# Patient Record
Sex: MALE | Race: OTHER | HISPANIC OR LATINO | URBAN - METROPOLITAN AREA
[De-identification: names, ages, dates, MRNs, and addresses within clinical notes are randomized per-mention and may not be internally consistent; named-entity substitution may affect disease eponyms.]

---

## 2017-04-21 ENCOUNTER — EMERGENCY (EMERGENCY)
Facility: HOSPITAL | Age: 32
LOS: 1 days | Discharge: HOME | End: 2017-04-21
Attending: EMERGENCY MEDICINE | Admitting: EMERGENCY MEDICINE
Payer: MEDICAID

## 2017-04-21 VITALS
TEMPERATURE: 96 F | SYSTOLIC BLOOD PRESSURE: 143 MMHG | DIASTOLIC BLOOD PRESSURE: 90 MMHG | HEART RATE: 63 BPM | HEIGHT: 66 IN | OXYGEN SATURATION: 96 % | RESPIRATION RATE: 16 BRPM

## 2017-04-21 DIAGNOSIS — H02.89 OTHER SPECIFIED DISORDERS OF EYELID: ICD-10-CM

## 2017-04-21 PROCEDURE — 99283 EMERGENCY DEPT VISIT LOW MDM: CPT | Mod: 25

## 2017-04-21 PROCEDURE — 99283 EMERGENCY DEPT VISIT LOW MDM: CPT

## 2017-04-21 RX ORDER — ERYTHROMYCIN BASE 5 MG/GRAM
1 OINTMENT (GRAM) OPHTHALMIC (EYE)
Qty: 1 | Refills: 0
Start: 2017-04-21 | End: 2017-05-01

## 2017-04-21 NOTE — ED PROVIDER NOTE - OBJECTIVE STATEMENT
31M with left lower eyelid pain and swelling x 2-3 days, no eye pain or redness. No vision loss. No fevers or chills

## 2017-04-21 NOTE — ED ADULT NURSE NOTE - OBJECTIVE STATEMENT
30 y/o male AXOX3 present in the ED with chief complaint of having a " white ball" underneath left eye, wipe it down with hot rag, and became swollen. patient denies any discharge in the eye, fever. denies any 30 y/o male AXOX3 present in the ED with chief complaint of having a " white ball" underneath left eye, wipe it down with hot rag, and became swollen. patient denies any discharge in the eye,  denies any blurry vision , fever. denies any nausea, vomiting. denies any SOB, lung field clear bilaterally. cap refill less then 3 seconds. strong pulses noted. abdomen soft nontender, nondistended. plan of care explained to patient. support and safety provided.

## 2017-04-21 NOTE — ED PROVIDER NOTE - MEDICAL DECISION MAKING DETAILS
Attending MD Hanson: 31M with hordeolum of left eye, will treat with warm compresses, erythro ointment, optho f/u to ensure resolution

## 2017-05-25 NOTE — ED ADULT NURSE NOTE - CAS TRG GEN SKIN COLOR
LOV 3/21/17 for ADD, med check, depressive disorder.  NOV  NONE listed.    Refill requested for ADDERALL XR, 20mg 24hr capsule. Take 1 capsule by mouth 3 X daily. Quantity 90.     Last script dispensed 3/23/17.    pdmp printed and placed on PCP's desk. Medication prepped.     Last med contract signed in 2015.    Urine drug screen 2/8/17 old drug screen. Normal for Patient.       Pink

## 2019-04-20 ENCOUNTER — EMERGENCY (EMERGENCY)
Facility: HOSPITAL | Age: 34
LOS: 1 days | Discharge: HOME | End: 2019-04-20
Attending: EMERGENCY MEDICINE
Payer: MEDICAID

## 2019-04-20 VITALS
OXYGEN SATURATION: 97 % | SYSTOLIC BLOOD PRESSURE: 128 MMHG | DIASTOLIC BLOOD PRESSURE: 72 MMHG | RESPIRATION RATE: 20 BRPM | TEMPERATURE: 98 F | HEIGHT: 67 IN | HEART RATE: 84 BPM | WEIGHT: 179.9 LBS

## 2019-04-20 PROCEDURE — 99283 EMERGENCY DEPT VISIT LOW MDM: CPT

## 2019-04-20 RX ORDER — AMOXICILLIN 250 MG/5ML
1 SUSPENSION, RECONSTITUTED, ORAL (ML) ORAL
Qty: 21 | Refills: 0
Start: 2019-04-20 | End: 2019-04-26

## 2019-04-20 RX ORDER — OXYCODONE HYDROCHLORIDE 5 MG/1
1 TABLET ORAL
Qty: 8 | Refills: 0
Start: 2019-04-20

## 2019-04-20 RX ORDER — AMOXICILLIN 250 MG/5ML
500 SUSPENSION, RECONSTITUTED, ORAL (ML) ORAL ONCE
Qty: 0 | Refills: 0 | Status: COMPLETED | OUTPATIENT
Start: 2019-04-20 | End: 2019-04-20

## 2019-04-20 RX ORDER — ACETAMINOPHEN 500 MG
975 TABLET ORAL ONCE
Qty: 0 | Refills: 0 | Status: COMPLETED | OUTPATIENT
Start: 2019-04-20 | End: 2019-04-20

## 2019-04-20 RX ORDER — OXYCODONE HYDROCHLORIDE 5 MG/1
5 TABLET ORAL ONCE
Qty: 0 | Refills: 0 | Status: DISCONTINUED | OUTPATIENT
Start: 2019-04-20 | End: 2019-04-20

## 2019-04-20 RX ADMIN — OXYCODONE HYDROCHLORIDE 5 MILLIGRAM(S): 5 TABLET ORAL at 16:14

## 2019-04-20 RX ADMIN — Medication 500 MILLIGRAM(S): at 16:14

## 2019-04-20 RX ADMIN — Medication 975 MILLIGRAM(S): at 16:14

## 2019-04-20 NOTE — ED PROVIDER NOTE - CLINICAL SUMMARY MEDICAL DECISION MAKING FREE TEXT BOX
Dr. Stanley (Attending Physician)  right lower posterior molar pain after attempted resection but failed.  no palpable periapical abscess but ttp will give pain control and start amoxicillin and have patient follow-up with another dental surgeon monday per plan with his dentist.

## 2019-04-20 NOTE — ED ADULT NURSE NOTE - OBJECTIVE STATEMENT
32 yo m presents to the ED with c/o of left lower dental pain 10/10. Pain began 3 days ago. PT went to dentist, was told they could not extract the tooth. Pt is here for pain.

## 2019-04-20 NOTE — ED PROVIDER NOTE - NSFOLLOWUPCLINICS_GEN_ALL_ED_FT
[facility]  Dental  [address] [place] [id number]  Phone: [phone]  Fax:   Follow Up Time:

## 2019-04-20 NOTE — ED ADULT NURSE NOTE - NSIMPLEMENTINTERV_GEN_ALL_ED
Implemented All Universal Safety Interventions:  [place] to call system. Call bell, personal items and telephone within reach. Instruct patient to call for assistance. Room bathroom lighting operational. Non-slip footwear when patient is off stretcher. Physically safe environment: no spills, clutter or unnecessary equipment. Stretcher in lowest position, wheels locked, appropriate side rails in place.

## 2019-04-20 NOTE — ED PROVIDER NOTE - NSFOLLOWUPINSTRUCTIONS_ED_ALL_ED_FT
Hydrate.  Eat soft diet.  Amoxicillin 500mg every 8hours for 7days.  Keep continue Ibuprofen as directed by your dentist.  Oxycodone 1 tablet every 6hours for severe pain with cautions of drowsiness and constipation.  Stool softener as needed.  Follow up with dental clinic, [phone], call Monday for an appointment in 2days.  Return for any concerns or worsening symptoms.

## 2019-04-20 NOTE — ED PROVIDER NOTE - OBJECTIVE STATEMENT
34yo male pt, no PMHx, ambulatory c/o worsening right lower tooth pain today. Pt stated he felt mild dental pain (Right lower molar) for 3-4days and went to his dentist this morning. He reported his dentist's unable to remove the tooth after attempts and referred to a specialist. The pain's gradually worsen with radiating pain to head and he took Ibuprofen without relief. Denies fever, chills or recent sickness. Denies facial swelling. Denies sensory changes or weakness to extremities. Denies CP/SOB/ABD pain or N/V.

## 2019-04-20 NOTE — ED ADULT TRIAGE NOTE - ESI TRIAGE ACUITY LEVEL, MLM
4 Excisional Biopsy Additional Text (Leave Blank If You Do Not Want): The margin was drawn around the clinically apparent lesion. An elliptical shape was then drawn on the skin incorporating the lesion and margins.  Incisions were then made along these lines to the appropriate tissue plane and the lesion was extirpated.

## 2019-04-20 NOTE — ED PROVIDER NOTE - PHYSICAL EXAMINATION
NAD, VSS, Afebrile, No facial swelling. + Tender to #32 tooth with gum swelling, no fluctuating or bleeding. Neck supple. Neuro- intact.

## 2019-04-20 NOTE — ED PROVIDER NOTE - ATTENDING CONTRIBUTION TO CARE
Dr. Stanley (Attending Physician)  I performed a history and physical exam of the patient and discussed their management with the advanced care provider. I reviewed the advanced care provider's note and agree with the documented findings and plan of care. My medical decision making and objective findings are found above.

## 2022-03-20 ENCOUNTER — EMERGENCY (EMERGENCY)
Facility: HOSPITAL | Age: 37
LOS: 0 days | Discharge: HOME | End: 2022-03-20
Attending: STUDENT IN AN ORGANIZED HEALTH CARE EDUCATION/TRAINING PROGRAM
Payer: MEDICAID

## 2022-03-20 VITALS
HEIGHT: 67 IN | RESPIRATION RATE: 16 BRPM | TEMPERATURE: 98 F | WEIGHT: 177.91 LBS | SYSTOLIC BLOOD PRESSURE: 153 MMHG | DIASTOLIC BLOOD PRESSURE: 103 MMHG | OXYGEN SATURATION: 98 % | HEART RATE: 103 BPM

## 2022-03-20 VITALS
SYSTOLIC BLOOD PRESSURE: 135 MMHG | RESPIRATION RATE: 18 BRPM | TEMPERATURE: 98 F | OXYGEN SATURATION: 99 % | DIASTOLIC BLOOD PRESSURE: 79 MMHG

## 2022-03-20 DIAGNOSIS — N48.89 OTHER SPECIFIED DISORDERS OF PENIS: ICD-10-CM

## 2022-03-20 LAB
APPEARANCE UR: CLEAR — SIGNIFICANT CHANGE UP
BACTERIA # UR AUTO: ABNORMAL
BILIRUB UR-MCNC: NEGATIVE — SIGNIFICANT CHANGE UP
COLOR SPEC: YELLOW — SIGNIFICANT CHANGE UP
COMMENT - URINE: SIGNIFICANT CHANGE UP
DIFF PNL FLD: NEGATIVE — SIGNIFICANT CHANGE UP
GLUCOSE UR QL: NEGATIVE MG/DL — SIGNIFICANT CHANGE UP
KETONES UR-MCNC: NEGATIVE — SIGNIFICANT CHANGE UP
LEUKOCYTE ESTERASE UR-ACNC: NEGATIVE — SIGNIFICANT CHANGE UP
NITRITE UR-MCNC: NEGATIVE — SIGNIFICANT CHANGE UP
PH UR: 6 — SIGNIFICANT CHANGE UP (ref 5–8)
PROT UR-MCNC: 15 MG/DL
SP GR SPEC: 1.02 — SIGNIFICANT CHANGE UP (ref 1.01–1.02)
UROBILINOGEN FLD QL: NEGATIVE MG/DL — SIGNIFICANT CHANGE UP
WBC UR QL: SIGNIFICANT CHANGE UP

## 2022-03-20 PROCEDURE — 99284 EMERGENCY DEPT VISIT MOD MDM: CPT

## 2022-03-20 PROCEDURE — 99282 EMERGENCY DEPT VISIT SF MDM: CPT

## 2022-03-20 NOTE — ED PROVIDER NOTE - PHYSICAL EXAMINATION
.examgen VITALS: reviewed  GEN: NAD, A & O x 4  HEAD/EYES: NCAT, EOMI, anicteric sclerae,   ENT: mucus membranes moist, oropharynx WNL, trachea midline,   RESP: unlabored breathing  CV: distal pulses intact and symmetric bilaterally  MSK: extremities atraumatic and nontender, no edema, no asymmetry.   SKIN: warm, dry, no rash, no bruising, no cyanosis. color appropriate for ethnicity  NEURO: alert, mentating appropriately, no facial asymmetry.  PSYCH: Affect appropriate VITALS: reviewed  GEN: NAD, A & O x 4  HEAD/EYES: NCAT, EOMI, anicteric sclerae,   ENT: mucus membranes moist, oropharynx WNL, trachea midline,   RESP: unlabored breathing  CV: distal pulses intact and symmetric bilaterally  MSK: extremities atraumatic and nontender, no edema, no asymmetry.   SKIN: warm, dry, no rash, no bruising, no cyanosis. color appropriate for ethnicity  : Exam chaperoned by [facility] EKG tech. Swelling to ventral surface of foreskin retractable, no pain, tenderness d/c from penis, no testicular swelling or tenderness, no bruising noted.    NEURO: alert, mentating appropriately, no facial asymmetry.  PSYCH: Affect appropriate

## 2022-03-20 NOTE — ED PROVIDER NOTE - OBJECTIVE STATEMENT
36 year old male with no PMH who presents to the ED after noticing swelling to penis upon waking up this morning. Pt had sexual intercourse with penile ring and woke up with penis being swollen. Pt denies any pain, able to urinate, pt had an erection upon waking up this morning.

## 2022-03-20 NOTE — CONSULT NOTE ADULT - SUBJECTIVE AND OBJECTIVE BOX
Chief Complaint:  Patient is a 36y old  Male who presents with a chief complaint of swelling to the penis    HPI:  36M denies pmh presents to ED c/o swelling to penis first noticed this AM.  Pt denies any pain, dysuria, hematuria, difficulty voiding.  Pt reports wearing a ring around the base of his erect penis last night and having sex for 3-4 hours.  After the sex pt says he removed the ring and noticed his penis was swollen.  This morning when penis was still swollen he decided to come to ER to get it checked out.  He had an erection earlier this AM which subsided naturally.  Pt is uncircumcised.  He reports no difficulty retracting or replacing foreskin.    PMH/PSH:PAST MEDICAL & SURGICAL HISTORY:  No pertinent past medical history    No significant past surgical history        Allergies:  No Known Allergies      Medications:      REVIEW OF SYSTEMS:  All other review of systems is negative unless indicated above.    Relevant Family History:   FAMILY HISTORY:      Relevant Social History:  Denies ETOH or tobacco history    Physical Exam:    Vital Signs:  Vital Signs Last 24 Hrs  T(C): 36.4 (20 Mar 2022 14:23), Max: 36.7 (20 Mar 2022 11:22)  T(F): 97.5 (20 Mar 2022 14:23), Max: 98 (20 Mar 2022 11:22)  HR: 103 (20 Mar 2022 11:22) (103 - 103)  BP: 135/79 (20 Mar 2022 14:23) (135/79 - 153/103)  BP(mean): --  RR: 18 (20 Mar 2022 14:23) (16 - 18)  SpO2: 99% (20 Mar 2022 14:23) (98% - 99%)  Daily Height in cm: 170.18 (20 Mar 2022 11:22)    Daily     Constitutional: WDWN resting comfortably in bed; NAD  HEENT: NC/AT, PERRL, EOMI, anicteric sclera, no nasal discharge; uvula midline, no oropharyngeal erythema or exudates  Neck: supple; no JVD or thyromegaly  Respiratory: CTA B/L; no W/R/R, no retractions  Cardiac: +S1/S2; RRR; no M/R/G; PMI non-displaced  Gastrointestinal: soft, NT/ND; no rebound or guarding; +BS   : (chaperoned by PA Reyes) uncircumcised  Extremities: , no clubbing or cyanosis; no peripheral edema  Musculoskeletal:  no joint swelling, tenderness or erythema  Vascular: 2+ radial, femoral, DP/PT pulses B/L  Skin: warm, dry and intact; no rashes, wounds, or scars  Neurologic: AAOx3; CNS grossly intact; no focal deficits no asterixis, no tremor, no encephalopathy    Laboratory:                Urinalysis Basic - ( 20 Mar 2022 13:47 )    Color: Yellow / Appearance: Clear / S.025 / pH: x  Gluc: x / Ketone: Negative  / Bili: Negative / Urobili: Negative mg/dL   Blood: x / Protein: 15 mg/dL / Nitrite: Negative   Leuk Esterase: Negative / RBC: x / WBC 0-2   Sq Epi: x / Non Sq Epi: x / Bacteria: Few          Intake and Output      Imaging:       Chief Complaint:  Patient is a 36y old  Male who presents with a chief complaint of swelling to the penis    HPI:  36M denies pmh presents to ED c/o swelling to penis first noticed this AM.  Swelling is at the tip of the penis to the skin just proximal to glans.  Pt denies any pain, dysuria, hematuria, difficulty voiding.  Pt reports wearing a ring around the base of his erect penis last night and having sex for 3-4 hours.  After sex pt says he removed the ring and noticed his penis was swollen.  This morning when penis was still swollen he decided to come to ER to get it checked out.  He had an erection earlier this AM which subsided naturally.  Pt is uncircumcised.  He reports no difficulty retracting or replacing foreskin.    PMH/PSH:PAST MEDICAL & SURGICAL HISTORY:  No pertinent past medical history    No significant past surgical history        Allergies:  No Known Allergies      Medications:      REVIEW OF SYSTEMS:  All other review of systems is negative unless indicated above.    Relevant Family History:   FAMILY HISTORY:      Relevant Social History:  Denies ETOH or tobacco history    Physical Exam:    Vital Signs:  Vital Signs Last 24 Hrs  T(C): 36.4 (20 Mar 2022 14:23), Max: 36.7 (20 Mar 2022 11:22)  T(F): 97.5 (20 Mar 2022 14:23), Max: 98 (20 Mar 2022 11:22)  HR: 103 (20 Mar 2022 11:22) (103 - 103)  BP: 135/79 (20 Mar 2022 14:23) (135/79 - 153/103)  BP(mean): --  RR: 18 (20 Mar 2022 14:23) (16 - 18)  SpO2: 99% (20 Mar 2022 14:23) (98% - 99%)  Daily Height in cm: 170.18 (20 Mar 2022 11:22)    Daily     Constitutional: WDWN resting comfortably in bed; NAD  HEENT: NC/AT, PERRL, EOMI, anicteric sclera, no nasal discharge; uvula midline, no oropharyngeal erythema or exudates  Neck: supple; no JVD or thyromegaly  Respiratory: CTA B/L; no W/R/R, no retractions  Cardiac: +S1/S2; RRR; no M/R/G; PMI non-displaced  Gastrointestinal: soft, NT/ND; no rebound or guarding; +BS   : (chaperoned by PA Reyes) uncircumcised, swelling to ventral distal penis, glans is unremarkable and without deformity, foreskin is swollen but is able to retract and replace around glans.  No lesions or discharge, bleeding.  Rest of  exam is unremarkable.    Extremities: , no clubbing or cyanosis; no peripheral edema  Musculoskeletal:  no joint swelling, tenderness or erythema  Vascular: 2+ radial, femoral, DP/PT pulses B/L  Skin: warm, dry and intact; no rashes, wounds, or scars  Neurologic: AAOx3; CNS grossly intact; no focal deficits no asterixis, no tremor, no encephalopathy            Urinalysis Basic - ( 20 Mar 2022 13:47 )    Color: Yellow / Appearance: Clear / S.025 / pH: x  Gluc: x / Ketone: Negative  / Bili: Negative / Urobili: Negative mg/dL   Blood: x / Protein: 15 mg/dL / Nitrite: Negative   Leuk Esterase: Negative / RBC: x / WBC 0-2   Sq Epi: x / Non Sq Epi: x / Bacteria: Few

## 2022-03-20 NOTE — CONSULT NOTE ADULT - ASSESSMENT
36M with swelling to the  36M no pertinent pmh with swelling to the foreskin at ventral surface of penis after sex for 4 hours.  No sign of paraphimosis or phimosis, pt without pain, UA wnl, without hematuria.      -Pt stable/ready for dc with strict return precautions.

## 2022-03-20 NOTE — ED ADULT NURSE NOTE - OBJECTIVE STATEMENT
pt presents to ed for swelling to penis , pt says he was having sex last night and left ring around penis, woke up to swelling denies any pain. no discolor No PMH no allergies

## 2022-03-20 NOTE — ED PROVIDER NOTE - CLINICAL SUMMARY MEDICAL DECISION MAKING FREE TEXT BOX
low suspicion for paraphimosis or penile fracture, spoke with Dr. Fuller who recommends UA, obtain surgical PA consult and likely d/c home

## 2022-03-20 NOTE — ED PROVIDER NOTE - PATIENT PORTAL LINK FT
You can access the FollowMyHealth Patient Portal offered by [facility] by registering at the following website: [email or web address]. By joining [employer]’s FollowMyHealth portal, you will also be able to view your health information using other applications (apps) compatible with our system.

## 2022-03-20 NOTE — CONSULT NOTE ADULT - ATTENDING COMMENTS
no pop, bruising, palp abnormality on exam  No pain  Had completed sexual intercourse; had orgasm  Had morning erections  No issues voiding   Gu- some ventral swelling, symmetrically; no palpable abnormality and no hematoma, ecchymoses  UA neg  F/u out pt for repeat exam  Ice prn

## 2022-03-21 ENCOUNTER — EMERGENCY (EMERGENCY)
Facility: HOSPITAL | Age: 37
LOS: 0 days | Discharge: HOME | End: 2022-03-21
Attending: EMERGENCY MEDICINE
Payer: MEDICAID

## 2022-03-21 VITALS
HEIGHT: 67 IN | WEIGHT: 179.9 LBS | SYSTOLIC BLOOD PRESSURE: 127 MMHG | RESPIRATION RATE: 15 BRPM | DIASTOLIC BLOOD PRESSURE: 83 MMHG | TEMPERATURE: 98 F | HEART RATE: 77 BPM | OXYGEN SATURATION: 97 %

## 2022-03-21 DIAGNOSIS — M79.601 PAIN IN RIGHT ARM: ICD-10-CM
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
## 2022-03-21 LAB
CULTURE RESULTS: NO GROWTH — SIGNIFICANT CHANGE UP
SPECIMEN SOURCE: SIGNIFICANT CHANGE UP

## 2022-03-21 PROCEDURE — 73060 X-RAY EXAM OF HUMERUS: CPT | Mod: 26,RT

## 2022-03-21 PROCEDURE — 99284 EMERGENCY DEPT VISIT MOD MDM: CPT

## 2022-03-21 RX ORDER — IBUPROFEN 200 MG
1 TABLET ORAL
Qty: 15 | Refills: 0
Start: 2022-03-21 | End: 2022-03-25

## 2022-03-21 RX ORDER — KETOROLAC TROMETHAMINE 30 MG/ML
60 SYRINGE (ML) INJECTION ONCE
Refills: 0 | Status: DISCONTINUED | OUTPATIENT
Start: 2022-03-21 | End: 2022-03-21

## 2022-03-21 RX ADMIN — Medication 60 MILLIGRAM(S): at 20:50

## 2022-03-21 NOTE — ED PROVIDER NOTE - OBJECTIVE STATEMENT
35 y/o M with no pertinent PMHx presents to the ED c/o rt bicep pain since x3 months ago s/p a gym session, but when he went back x1 week ago, started to have pain again. Endorses difficulty lifting things with his rt hand, reduced ROM, but denies reduced sensation, numbness or tingling. Pt is not vaccinated against COVID. Patient denies EtOH/tobacco/illicit substance use.

## 2022-03-21 NOTE — ED ADULT NURSE NOTE - NSIMPLEMENTINTERV_GEN_ALL_ED
Implemented All Universal Safety Interventions:  [place] to call system. Call bell, personal items and telephone within reach. Instruct patient to call for assistance. Room bathroom lighting operational. Non-slip footwear when patient is off stretcher. Physically safe environment: no spills, clutter or unnecessary equipment. Stretcher in lowest position, wheels locked, appropriate side rails in place.

## 2022-03-21 NOTE — ED PROVIDER NOTE - PATIENT PORTAL LINK FT
You can access the FollowMyHealth Patient Portal offered by [facility] by registering at the following website: [email or web address]. By joining [employer]’s FollowMyHealth portal, you will also be able to view your health information using other applications (apps) compatible with our system.

## 2022-03-21 NOTE — ED ADULT NURSE NOTE - OBJECTIVE STATEMENT
received er iso 21 c/o r bicep pain states injured at gym while working out 3 months ago pain was intermittent but worse over past 2 weeks and more constant since morning no swelling noted pain worse upon movement

## 2022-03-21 NOTE — ED ADULT NURSE NOTE - CHIEF COMPLAINT QUOTE
Right arm and shoulder pains s/p injury in gym months ago and for the past week similar pains again.

## 2022-03-21 NOTE — ED PROVIDER NOTE - CLINICAL SUMMARY MEDICAL DECISION MAKING FREE TEXT BOX
Weakness of flexion at the arm and elevation of the Rt UE, potentially consistent with bicep muscle weakness or deltoid weakness. Hx suggests muscle/tendon tear, unlikely neurological process given normal sensation, check x-ray advise outpt MRI.

## 2022-03-21 NOTE — ED PROVIDER NOTE - NSICDXPASTMEDICALHX_GEN_ALL_CORE_FT
Medicare Wellness Visit  Plan for Preventive Care    A good way for you to stay healthy is to use preventive care.  Medicare covers many services that can help you stay healthy.* The goal of these services is to find any health problems as quickly as possible. Finding problems early can help make them easier to treat.  Your personal plan below lists the services you may need and when they are due.     Health Maintenance Summary     Topic Due On Due Status Completed On    MAMMOGRAM - BREAST CANCER SCREENING Mar 14, 2020 Not Due Mar 14, 2018    Colorectal Cancer Screening - Colonoscopy Aug 16, 2028 Not Due Aug 16, 2018    Osteoporosis Screening  Completed Mar 7, 2017    Immunization - TDAP Pregnancy  Hidden     Medicare Wellness Visit Mar 15, 2020 Not Due Mar 15, 2019    IMMUNIZATION - DTaP/Tdap/Td Apr 9, 2023 Not Due Apr 9, 2013    Immunization-Influenza  Completed Oct 26, 2018    Depression Screening Mar 15, 2020 Not Due Mar 15, 2019    Hepatitis C Screening Oct 26, 1997 Overdue     Pneumococcal Vaccine 65+ Low/Medium Risk  Completed Mar 2, 2015    Immunization - Shingles Jun 7, 2013 Overdue Apr 12, 2013    Immunization - MMR  Hidden            Preventive Care for Women and Men    Heart Screenings (Cardiovascular):  · Blood tests are used to check your cholesterol, lipid and triglyceride levels. High levels can increase your risk for heart disease and stroke. High levels can be treated with medications, diet and exercise. Lowering your levels can help keep your heart and blood vessels healthy.  Your provider will order these tests if they are needed.    · An ultrasound is done to see if you have an abdominal aortic aneurysm (AAA).  This is an enlargement of one of the main blood vessels that delivers blood to the body.   In the [place], 9,000 deaths are caused by AAA.  You may not even know you have this problem and as many as 1 in 3 people will have a serious problem if it is not treated.  Early diagnosis  allows for more effective treatment and cure.  If you have a family history of AAA or are a male age 65-75 who has smoked, you are at higher risk of an AAA.  Your provider can order this test, if needed.    Colorectal Screening:  · There are many tests that are used to check for cancer of your colon and rectum. You and your provider should discuss what test is best for you and when to have it done.  Options include:  · Screening Colonoscopy: exam of the entire colon, seen through a flexible lighted tube.  · Flexible Sigmoidoscopy: exam of the last third (sigmoid portion) of the colon and rectum, seen through a flexible lighted tube.  · Cologuard DNA stool test: a sample of your stool is used to screen for cancer and unseen blood in your stool.  · Fecal Occult Blood Test: a sample of your stool is studied to find any unseen blood    Flu Shot:  · An immunization that helps to prevent influenza (the flu). You should get this every year. The best time to get the shot is in the fall.    Pneumococcal Shot:  • Vaccines are available that can help prevent pneumococcal disease, which is any type of infection caused by Streptococcus pneumoniae bacteria.   Their use can prevent some cases of pneumonia, meningitis, and sepsis. There are two types of pneumococcal vaccines:   o Conjugate vaccines (PCV-13 or Prevnar 13®) - helps protect against the 13 types of pneumococcal bacteria that are the most common causes of serious infections in children and adults.    o Polysaccharide vaccine (PPSV23 or Qrudhuhny14®) - helps protect against 23 types of pneumococcal bacteria for patients who are recommended to get it.  These vaccines should be given at least 12 months apart.  A booster is usually not needed.     Hepatitis B Shot:  · An immunization that helps to protect people from getting Hepatitis B. Hepatitis B is a virus that spreads through contact with infected blood or body fluids. Many people with the virus do not have symptoms.   The virus can lead to serious problems, such as liver disease. Some people are at higher risk than others. Your doctor will tell you if you need this shot.     Diabetes Screening:  · A test to measure sugar (glucose) in your blood is called a fasting blood sugar. Fasting means you cannot have food or drink for at least 8 hours before the test. This test can detect diabetes long before you may notice symptoms.    Glaucoma Screening:  · Glaucoma screening is performed by your eye doctor. The test measures the fluid pressure inside your eyes to determine if you have glaucoma.     Hepatitis C Screening:  · A blood test to see if you have the hepatitis C virus.  Hepatitis C attacks the liver and is a major cause of chronic liver disease.  Medicare will cover a single screening for all adults born between 1945 & 1965, or high risk patients (people who have injected illegal drugs or people who have had blood transfusions).  High risk patients who continue to inject illegal drugs can be screened for Hepatitis C every year.    Smoking and Tobacco-Use Cessation Counseling:  · Tobacco is the single greatest cause of disease and early death in our country today. Medication and counseling together can increase a person’s chance of quitting for good.   · Medicare covers two quitting attempts per year, with four counseling sessions per attempt (eight sessions in a 12 month period)    Preventive Screening tests for Women    Screening Mammograms and Breast Exams:  · An x-ray of your breasts to check for breast cancer before you or your doctor may be able to feel it.  If breast cancer is found early it can usually be treated with success.    Pelvic Exams and Pap Tests:  · An exam to check for cervical and vaginal cancer. A Pap test is a lab test in which cells are taken from your cervix and sent to the lab to look for signs of cervical cancer. If cancer of the cervix is found early, chances for a cure are good. Testing can generally  end at age 65, or if a woman has a hysterectomy for a benign condition. Your provider may recommend more frequent testing if certain abnormal results are found.    Bone Mass Measurements:  · A painless x-ray of your bone density to see if you are at risk for a broken bone. Bone density refers to the thickness of bones or how tightly the bone tissue is packed.    Preventive Screening tests for Men    Prostate Screening:  · PSA - Prostate Cancer blood test.  Experts do not recommend routine screening of healthy men with no signs or symptoms of prostate disease.  However, men should not ignore urinary symptoms, and should discuss their family history with their doctor.    *Medicare pays for many preventive services to keep you healthy. For some of these services, you might have to pay a deductible, coinsurance, and / or copayment.  The amounts vary depending on the type of services you need and the kind of Medicare health plan you have.               PAST MEDICAL HISTORY:  No pertinent past medical history

## 2022-03-21 NOTE — ED PROVIDER NOTE - PHYSICAL EXAMINATION
Gen: Alert, NAD  Head: NC, AT   Eyes: PERRL, EOMI, normal lids/conjunctiva  ENT: normal hearing, patent oropharynx without erythema/exudate, uvula midline  Neck: supple, no tenderness, Trachea midline  Pulm: Bilateral BS, normal resp effort, no wheeze/stridor/retractions  CV: RRR, no M/R/G, 2+ radial and dp pulses bl, no edema  Abd: soft, NT/ND, +BS, no hepatosplenomegaly  Mskel: weakness of flexion at the arm and elevation of the Rt upper extremity.   Skin: no rash, no bruising   Neuro: AAOx3, no sensory/motor deficits, CN 2-12 intact

## 2022-06-12 NOTE — ED PROVIDER NOTE - CARE PROVIDER_API CALL
97.7
Alanna Luo)  Urology  [address] [place] [address]  Phone: [phone]  Fax: [phone]  Follow Up Time: 4-6 Days

## 2022-08-01 ENCOUNTER — EMERGENCY (EMERGENCY)
Facility: HOSPITAL | Age: 37
LOS: 0 days | Discharge: HOME | End: 2022-08-02
Attending: EMERGENCY MEDICINE

## 2022-08-01 VITALS
TEMPERATURE: 98 F | SYSTOLIC BLOOD PRESSURE: 114 MMHG | RESPIRATION RATE: 18 BRPM | OXYGEN SATURATION: 99 % | HEART RATE: 61 BPM | DIASTOLIC BLOOD PRESSURE: 79 MMHG

## 2022-08-01 VITALS
TEMPERATURE: 98 F | RESPIRATION RATE: 17 BRPM | HEART RATE: 82 BPM | HEIGHT: 67 IN | SYSTOLIC BLOOD PRESSURE: 112 MMHG | DIASTOLIC BLOOD PRESSURE: 75 MMHG | OXYGEN SATURATION: 99 %
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
## 2022-08-01 DIAGNOSIS — M79.672 PAIN IN LEFT FOOT: ICD-10-CM
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
## 2022-08-01 PROCEDURE — 99284 EMERGENCY DEPT VISIT MOD MDM: CPT

## 2022-08-01 RX ORDER — ACETAMINOPHEN 500 MG
975 TABLET ORAL ONCE
Refills: 0 | Status: COMPLETED | OUTPATIENT
Start: 2022-08-01 | End: 2022-08-01

## 2022-08-01 RX ADMIN — Medication 975 MILLIGRAM(S): at 23:53

## 2022-08-01 NOTE — ED PROVIDER NOTE - CARE PROVIDER_API CALL
Bonnie Redd (DPM)  Podiatric Medicine and Surgery  [address] [place] [address]  Phone: [phone]  Fax: [phone]  Follow Up Time: Urgent

## 2022-08-01 NOTE — ED ADULT NURSE NOTE - TEMPLATE
[de-identified] : ACC: [id number] EXAM:  US HEAD NECK SOFT TISSUE                      \par \par PROCEDURE DATE:  01/19/2022  \par \par \par \par INTERPRETATION:  US HEAD AND NECK SOFT TISSUE\par \par CLINICAL INFORMATION: surveillance cervical lymphadenopathy, please check \par entire neck;\par \par TECHNIQUE: Ultrasound of the soft tissues of the cervical lymphadenopathy \par was performed on 1/19/2022 10:00 AM\par \par COMPARISON: Neck ultrasound 12/20/2021\par \par FINDINGS:  There are bilateral cervical lymph nodes including a right \par level 5 measuring 0.7 x 0.4 cm and a right level 2 node measuring 1.2 x \par 0.6 cm. There is a left level 5 lymph node measuring 1.5 x 0.4 cm and a \par left level 2 lymph node measuring 1.8 x 0.7 cm. These lymph nodes \par demonstrate normal morphology. There is no fluid collection or abscess.\par \par IMPRESSION:\par \par Mildly enlarged bilateral cervical lymph nodes with normal morphology.\par \par --- End of Report ---\par \par \par \par \par \par TIERA ZELAYA MD; Attending Radiologist\par This document has been electronically signed. Jan 19 2022 10:26AM\par \par \par \par \par ACC: [id number] EXAM:  XR CHEST PA LAT 2V                      \par \par PROCEDURE DATE:  01/19/2022  \par \par \par \par INTERPRETATION:  CLINICAL INDICATION:  Cervical lymphadenopathy.\par \par TECHNIQUE: Frontal chest radiograph on 1/19/2022 10:12 AM\par \par COMPARISON: Chest radiograph from 12/23/2021.\par \par FINDINGS:\par The lungs are clear. There is no focal consolidation, pleural effusion or \par pneumothorax. The cardiomediastinal silhouette is within normal limits. \par There is again mild levoscoliosis of the lower thoracic spine.\par \par IMPRESSION:\par Clear lungs. No evidence of mediastinal or hilar adenopathy.\par \par --- End of Report ---\par \par \par \par \par LOTTIE AVILA MD; Resident Radiologist\par This document has been electronically signed.\par TIERA ZELAYA MD; Attending Radiologist\par This document has been electronically signed. Jan 19 2022 10:35AM
Orthopedic

## 2022-08-01 NOTE — ED PROVIDER NOTE - CARE PLAN
Principal Discharge DX:	Pain in joint, foot, left   1 Principal Discharge DX:	Pain in joint, foot, left  Secondary Diagnosis:	Phalanges fracture, foot

## 2022-08-01 NOTE — ED PROVIDER NOTE - PROGRESS NOTE DETAILS
Results reported to patient--possible 3rd prox phalangeal fracture of foot without displacement  Pt. reports feeling better after meds, pt. given crutches/surgical shoe, crutch training performed   pt. agrees to f/u with primary care outpt., referred [facility] for urgent f/u   pt. understands to return to ED if symptoms worsen; will d/c with Tylenol, RICE encouraged
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
## 2022-08-01 NOTE — ED PROVIDER NOTE - PATIENT PORTAL LINK FT
You can access the FollowMyHealth Patient Portal offered by [facility] by registering at the following website: [email or web address]. By joining [facility]’s FollowMyHealth portal, you will also be able to view your health information using other applications (apps) compatible with our system.
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
## 2022-08-01 NOTE — ED ADULT NURSE NOTE - NSFALLRSKASSESSDT_ED_ALL_ED
Discharge instructions discussed with no questions or concerns. Pt. Mony Austin understanding to follow up with PCP. Pt. Wheel out via wheel chair and assisted into lyft by this RN with no signs of distress noted.        Ernestina Durand RN  05/20/19 [id number] 01-Aug-2022 23:57
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
## 2022-08-02 PROCEDURE — 73630 X-RAY EXAM OF FOOT: CPT | Mod: 26,LT

## 2022-08-02 RX ORDER — ACETAMINOPHEN 500 MG
2 TABLET ORAL
Qty: 40 | Refills: 0
Start: 2022-08-02 | End: 2022-08-06

## 2022-08-28 NOTE — ED PROVIDER NOTE - PHYSICAL EXAMINATION
Vitals: WNL  Gen: AAOx3, NAD, sitting comfortably in stretcher  Head: ncat, perrla, eomi b/l  Neck: supple, no lymphadenopathy, no midline deviation  Heart: rrr, no m/r/g  Lungs: CTA b/l, no rales/ronchi/wheezes  Abd: soft, nontender, non-distended, no rebound or guarding  Ext: no clubbing/cyanosis/edema, L distal foot has diffuse bruising over distal dorsal midfoot and toes, no gross bony deformities, normal rom of L ankle and digits of foot, no gross swelling, sensation intact throughout LLE and L foot, normal cap refill in digits   Neuro: sensation and muscle strength intact b/l, antalgic gait no

## 2024-11-18 NOTE — ED PROVIDER NOTE - CARE PROVIDER_API CALL
NUTRITION EDUCATION      REASON FOR ASSESSMENT:  Nutrition Education - Low Fiber Diet     CURRENT DIET:  Low fiber diet - ordered soup and a roll for lunch    Note - pt is also lactose intolerance since her Whipple surgery.     NUTRITION DIAGNOSIS:  Food- and nutrition-related knowledge deficit R/t low fiber diet AEB patient with many questions pertaining to diet, plan for low fiber at discharge.     INTERVENTIONS:    Nutrition Prescription:  Low Fiber diet - 13 g/day    Implementation:      *  Nutrition Education (Content):   A)  Provided handout     Fiber restricted nutrition therapy    B)  Discussed     Foods to avoid    Foods recommended    Small/frequent meals    Chewing well       *  Nutrition Education (Application):   A)  Discussed current eating habits and recommended alternative food choices      *  Anticipate good compliance      *  Diet Education - refer to Education Flowsheet    Goals:      *  Patient will verbalize understanding of diet      *  All of the above goals met during the education session    Follow Up/Monitoring:      *  Provided RD contact information for future questions      *  Recommended Out-Patient Nutrition Referral, if further diet instructions are needed         Hussein Davenport)  Orthopaedic Surgery; Sports Medicine  [facility], [address] [place] [address]  Phone: [phone]  Fax: [phone]  Follow Up Time: 4-6 Days